# Patient Record
Sex: FEMALE | NOT HISPANIC OR LATINO | URBAN - METROPOLITAN AREA
[De-identification: names, ages, dates, MRNs, and addresses within clinical notes are randomized per-mention and may not be internally consistent; named-entity substitution may affect disease eponyms.]

---

## 2022-08-12 ENCOUNTER — OFFICE VISIT (OUTPATIENT)
Dept: URGENT CARE | Facility: URGENT CARE | Age: 22
End: 2022-08-12
Payer: COMMERCIAL

## 2022-08-12 VITALS
SYSTOLIC BLOOD PRESSURE: 117 MMHG | DIASTOLIC BLOOD PRESSURE: 60 MMHG | BODY MASS INDEX: 29.06 KG/M2 | WEIGHT: 164 LBS | TEMPERATURE: 98.6 F | HEART RATE: 84 BPM | HEIGHT: 63 IN | OXYGEN SATURATION: 100 %

## 2022-08-12 DIAGNOSIS — H61.21 IMPACTED CERUMEN OF RIGHT EAR: Primary | ICD-10-CM

## 2022-08-12 PROCEDURE — 99207 PR NO CHARGE LOS: CPT | Performed by: PHYSICIAN ASSISTANT

## 2022-08-12 PROCEDURE — 69209 REMOVE IMPACTED EAR WAX UNI: CPT | Mod: RT | Performed by: PHYSICIAN ASSISTANT

## 2022-08-13 NOTE — PROGRESS NOTES
"  Chief Complaint   Patient presents with     Otalgia     Plugged Ears     Right ear plugged           ASSESSMENT:     ICD-10-CM    1. Impacted cerumen of right ear  H61.21 IL REMOVAL IMPACTED CERUMEN IRRIGATION/LVG UNILAT           PLAN: Vital signs stable.  Can now hear normal after wax removal.No evidence of ear infection.  I have discussed clinical findings with patient.  Symptomatic care is discussed.  I have discussed the possibility of  worsening symptoms and indication to RTC or go to the ER if they occur.  All questions are answered, patient indicates understanding of these issues and is in agreement with plan.   Patient care instructions are discussed/given at the end of visit.   Lots of rest and fluids.      Maggie Angel PA-C      SUBJECTIVE:  21-year-old female comes in for right ear pain and decreased hearing for the past 5 days.  The day prior to onset had returned on a plane from a concert.  No drainage.  Did try over-the-counter eardrops.  No sore throat, cough, nasal congestion.  No fever.      No Known Allergies    No past medical history on file.    No current outpatient medications on file prior to visit.  No current facility-administered medications on file prior to visit.      Social History     Tobacco Use     Smoking status: Never Smoker     Smokeless tobacco: Never Used   Substance Use Topics     Alcohol use: Never       ROS:  CONSTITUTIONAL: Negative for fatigue or fever.  EYES: Negative for eye problems.  ENT: As above.  RESP: Negative for shortness of breath   CV: Negative for chest pains.  GI: Negative for vomiting.  MUSCULOSKELETAL:  Negative for significant muscle or joint pains.  NEUROLOGIC: Negative for headaches.  SKIN: Negative for rash.  PSYCH: Normal mentation for age.    OBJECTIVE:  /60 (BP Location: Left arm, Patient Position: Sitting, Cuff Size: Adult Regular)   Pulse 84   Temp 98.6  F (37  C) (Tympanic)   Ht 1.6 m (5' 3\")   Wt 74.4 kg (164 lb)   SpO2 100%   " BMI 29.05 kg/m    GENERAL APPEARANCE: Healthy, alert and no distress.  EYES:Conjunctiva/sclera clear.  EARS: Left TM is translucent.  No right mastoid tenderness.  No right tragal tenderness.  Right canal with cerumen impaction.    After wax removal canal appears normal, no erythema or swelling.  TM is translucent. Patient can hear normal.  THROAT: No erythema w/o tonsillar enlargement . No exudates.  NECK: Supple, nontender, no lymphadenopathy.  RESP: Breathing comfortably   CV: Regular rate and rhythm, normal S1 S2, no murmur noted.  NEURO: Awake, alert    SKIN: No rashes        Maggie Angel PA-C